# Patient Record
Sex: FEMALE | Race: BLACK OR AFRICAN AMERICAN
[De-identification: names, ages, dates, MRNs, and addresses within clinical notes are randomized per-mention and may not be internally consistent; named-entity substitution may affect disease eponyms.]

---

## 2017-12-18 NOTE — HP
Satellite [facility]





- Chief Complaint


Chief Complaint: right knee pain





- Past Medical History


Allergies/Adverse Reactions: 


 Allergies











Allergy/AdvReac Type Severity Reaction Status Date / Time


 


No Known Allergies Allergy   Verified 12/04/17 11:18














- Current Medications


Current Medications: 


 Home Medications











 Medication  Instructions  Recorded


 


Albuterol Sulfate Inhaler - 1 - 2 inh PO BID 12/04/17





[Ventolin Hfa Inhaler -]  


 


Aspirin [ASA -] 81 mg PO DAILY 12/04/17


 


Felodipine [Felodipine ER] 5 mg PO DAILY 12/04/17


 


Ferrous Sulfate 325 mg PO DAILY 12/04/17


 


Glipizide 5 mg PO DAILY 12/04/17


 


Metformin Xr [Glucophage *Xr* -] 750 mg PO DAILY 12/04/17


 


Multivit with Iron-Minerals 1 each PO DAILY 12/04/17





[Compete]  


 


Naproxen Sodium [Naproxen Sodium 500 mg PO BID PRN 12/04/17





Cr]  


 


Rosuvastatin Calcium 5 mg PO DAILY 12/04/17


 


Salmeterol/Fluticasone [Advair 1 inh PO BID 12/04/17





250Mcg/50Mcg -]  














Satellite Physical Exam





- Physical Examination


General Appearance: Well Nourished, Well Developed, Alert & Oriented x3


ENT: Clear


Lung: Normal air movement


Heart: Regular rate & rhythm


Extremities: Other (right knee- + swelling, + ttp, decr rom, nvi xrays show 

grade 4 djd)


Neurological: Intact, Alert, Oriented





Satellite Impression/Plan





- Impression/Plan


Impression: right knee djd


Operative Procedure: right grecia tkr


Date to be Performed: 12/19/17

## 2017-12-19 ENCOUNTER — HOSPITAL ENCOUNTER (INPATIENT)
Dept: HOSPITAL 74 - FM/S | Age: 62
LOS: 2 days | Discharge: HOME HEALTH | DRG: 470 | End: 2017-12-21
Attending: ORTHOPAEDIC SURGERY | Admitting: ORTHOPAEDIC SURGERY
Payer: COMMERCIAL

## 2017-12-19 VITALS — BODY MASS INDEX: 38 KG/M2

## 2017-12-19 DIAGNOSIS — I10: ICD-10-CM

## 2017-12-19 DIAGNOSIS — E11.9: ICD-10-CM

## 2017-12-19 DIAGNOSIS — J44.9: ICD-10-CM

## 2017-12-19 DIAGNOSIS — E66.8: ICD-10-CM

## 2017-12-19 DIAGNOSIS — M17.11: Primary | ICD-10-CM

## 2017-12-19 PROCEDURE — 8E0Y0CZ ROBOTIC ASSISTED PROCEDURE OF LOWER EXTREMITY, OPEN APPROACH: ICD-10-PCS | Performed by: ORTHOPAEDIC SURGERY

## 2017-12-19 PROCEDURE — 0SRC0JZ REPLACEMENT OF RIGHT KNEE JOINT WITH SYNTHETIC SUBSTITUTE, OPEN APPROACH: ICD-10-PCS | Performed by: ORTHOPAEDIC SURGERY

## 2017-12-19 RX ADMIN — GABAPENTIN ONE MG: 300 CAPSULE ORAL at 06:30

## 2017-12-19 RX ADMIN — ACETAMINOPHEN SCH MG: 325 TABLET ORAL at 16:31

## 2017-12-19 RX ADMIN — DOCUSATE SODIUM,SENNOSIDES SCH TABLET: 50; 8.6 TABLET, FILM COATED ORAL at 21:54

## 2017-12-19 RX ADMIN — CELECOXIB ONE MG: 200 CAPSULE ORAL at 06:30

## 2017-12-19 RX ADMIN — INSULIN ASPART SCH UNIT: 100 INJECTION, SOLUTION INTRAVENOUS; SUBCUTANEOUS at 16:28

## 2017-12-19 RX ADMIN — CEFAZOLIN SODIUM SCH MLS/HR: 2 SOLUTION INTRAVENOUS at 15:37

## 2017-12-19 RX ADMIN — ALBUTEROL SULFATE SCH: 90 AEROSOL, METERED RESPIRATORY (INHALATION) at 21:55

## 2017-12-19 RX ADMIN — OXYCODONE HYDROCHLORIDE ONE MG: 10 TABLET, FILM COATED, EXTENDED RELEASE ORAL at 06:30

## 2017-12-19 RX ADMIN — ACETAMINOPHEN SCH MG: 325 TABLET ORAL at 10:24

## 2017-12-19 RX ADMIN — INSULIN ASPART SCH UNIT: 100 INJECTION, SOLUTION INTRAVENOUS; SUBCUTANEOUS at 21:55

## 2017-12-19 RX ADMIN — OXYCODONE HYDROCHLORIDE SCH MG: 10 TABLET, FILM COATED, EXTENDED RELEASE ORAL at 21:56

## 2017-12-19 RX ADMIN — GABAPENTIN SCH MG: 300 CAPSULE ORAL at 21:54

## 2017-12-19 NOTE — OP
Operative Note





- Note:


Operative Date: 12/19/17 (duncan)


Pre-Operative Diagnosis: right knee djd


Operation: right grecia tkr


Post-Operative Diagnosis: Same as Pre-op


Surgeon: Naeem Croft


Assistant: Sabino Gonzalez


Anesthesiologist/CRNA: Elliot Avina


Anesthesia: Spinal, Local


Specimens Removed: bone fragments


Estimated Blood Loss (mls): 100


Operative Report Dictated: Yes

## 2017-12-20 VITALS — TEMPERATURE: 99.5 F

## 2017-12-20 LAB
DEPRECATED RDW RBC AUTO: 14.1 % (ref 11.6–15.6)
MCH RBC QN AUTO: 29.4 PG (ref 25.7–33.7)
MCHC RBC AUTO-ENTMCNC: 33 G/DL (ref 32–36)
MCV RBC: 89 FL (ref 80–96)
PLATELET # BLD AUTO: 368 K/MM3 (ref 134–434)
PMV BLD: 8.2 FL (ref 7.5–11.1)
WBC # BLD AUTO: 11.3 K/MM3 (ref 4–10.8)

## 2017-12-20 RX ADMIN — CELECOXIB ONE: 200 CAPSULE ORAL at 08:07

## 2017-12-20 RX ADMIN — BUDESONIDE AND FORMOTEROL FUMARATE DIHYDRATE SCH: 80; 4.5 AEROSOL RESPIRATORY (INHALATION) at 06:43

## 2017-12-20 RX ADMIN — HYDROCHLOROTHIAZIDE SCH MG: 12.5 CAPSULE ORAL at 10:01

## 2017-12-20 RX ADMIN — ACETAMINOPHEN SCH MG: 325 TABLET ORAL at 04:38

## 2017-12-20 RX ADMIN — GABAPENTIN ONE: 300 CAPSULE ORAL at 08:07

## 2017-12-20 RX ADMIN — GABAPENTIN SCH MG: 300 CAPSULE ORAL at 10:01

## 2017-12-20 RX ADMIN — OXYCODONE HYDROCHLORIDE SCH MG: 10 TABLET, FILM COATED, EXTENDED RELEASE ORAL at 21:27

## 2017-12-20 RX ADMIN — OXYCODONE HYDROCHLORIDE SCH MG: 10 TABLET, FILM COATED, EXTENDED RELEASE ORAL at 10:00

## 2017-12-20 RX ADMIN — ACETAMINOPHEN SCH: 325 TABLET ORAL at 08:08

## 2017-12-20 RX ADMIN — ACETAMINOPHEN SCH MG: 325 TABLET ORAL at 01:02

## 2017-12-20 RX ADMIN — PANTOPRAZOLE SODIUM SCH: 40 TABLET, DELAYED RELEASE ORAL at 08:08

## 2017-12-20 RX ADMIN — INSULIN ASPART SCH: 100 INJECTION, SOLUTION INTRAVENOUS; SUBCUTANEOUS at 17:25

## 2017-12-20 RX ADMIN — DOCUSATE SODIUM,SENNOSIDES SCH TABLET: 50; 8.6 TABLET, FILM COATED ORAL at 10:01

## 2017-12-20 RX ADMIN — ACETAMINOPHEN SCH MG: 325 TABLET ORAL at 15:42

## 2017-12-20 RX ADMIN — ALBUTEROL SULFATE SCH: 90 AEROSOL, METERED RESPIRATORY (INHALATION) at 08:08

## 2017-12-20 RX ADMIN — INSULIN ASPART SCH: 100 INJECTION, SOLUTION INTRAVENOUS; SUBCUTANEOUS at 08:08

## 2017-12-20 RX ADMIN — DOCUSATE SODIUM,SENNOSIDES SCH: 50; 8.6 TABLET, FILM COATED ORAL at 08:08

## 2017-12-20 RX ADMIN — INSULIN ASPART SCH UNITS: 100 INJECTION, SOLUTION INTRAVENOUS; SUBCUTANEOUS at 22:29

## 2017-12-20 RX ADMIN — MULTIVITAMIN TABLET SCH: TABLET at 08:08

## 2017-12-20 RX ADMIN — ALBUTEROL SULFATE SCH PUFF: 90 AEROSOL, METERED RESPIRATORY (INHALATION) at 21:28

## 2017-12-20 RX ADMIN — ROSUVASTATIN CALCIUM SCH MG: 5 TABLET, FILM COATED ORAL at 10:03

## 2017-12-20 RX ADMIN — INSULIN ASPART SCH: 100 INJECTION, SOLUTION INTRAVENOUS; SUBCUTANEOUS at 12:11

## 2017-12-20 RX ADMIN — ACETAMINOPHEN SCH MG: 325 TABLET ORAL at 10:02

## 2017-12-20 RX ADMIN — BUDESONIDE AND FORMOTEROL FUMARATE DIHYDRATE SCH PUFF: 80; 4.5 AEROSOL RESPIRATORY (INHALATION) at 10:01

## 2017-12-20 RX ADMIN — INSULIN ASPART SCH UNIT: 100 INJECTION, SOLUTION INTRAVENOUS; SUBCUTANEOUS at 06:43

## 2017-12-20 RX ADMIN — ASPIRIN 325 MG ORAL TABLET SCH MG: 325 PILL ORAL at 08:33

## 2017-12-20 RX ADMIN — FERROUS SULFATE TAB EC 324 MG (65 MG FE EQUIVALENT) SCH MG: 324 (65 FE) TABLET DELAYED RESPONSE at 10:01

## 2017-12-20 RX ADMIN — ACETAMINOPHEN SCH MG: 325 TABLET ORAL at 22:30

## 2017-12-20 RX ADMIN — VALSARTAN SCH MG: 160 TABLET, FILM COATED ORAL at 10:01

## 2017-12-20 RX ADMIN — GLIPIZIDE SCH MG: 5 TABLET ORAL at 06:41

## 2017-12-20 RX ADMIN — BUDESONIDE AND FORMOTEROL FUMARATE DIHYDRATE SCH PUFF: 80; 4.5 AEROSOL RESPIRATORY (INHALATION) at 21:28

## 2017-12-20 RX ADMIN — GABAPENTIN SCH MG: 300 CAPSULE ORAL at 21:27

## 2017-12-20 RX ADMIN — PANTOPRAZOLE SODIUM SCH MG: 40 TABLET, DELAYED RELEASE ORAL at 10:01

## 2017-12-20 RX ADMIN — MULTIVITAMIN TABLET SCH TAB: TABLET at 10:01

## 2017-12-20 RX ADMIN — OXYCODONE HYDROCHLORIDE ONE: 10 TABLET, FILM COATED, EXTENDED RELEASE ORAL at 08:07

## 2017-12-20 RX ADMIN — ALBUTEROL SULFATE SCH PUFF: 90 AEROSOL, METERED RESPIRATORY (INHALATION) at 10:02

## 2017-12-20 RX ADMIN — FERROUS SULFATE TAB EC 324 MG (65 MG FE EQUIVALENT) SCH: 324 (65 FE) TABLET DELAYED RESPONSE at 08:08

## 2017-12-20 RX ADMIN — AMLODIPINE BESYLATE SCH MG: 5 TABLET ORAL at 10:39

## 2017-12-20 RX ADMIN — CEFAZOLIN SODIUM SCH MLS/HR: 2 SOLUTION INTRAVENOUS at 00:00

## 2017-12-20 RX ADMIN — DOCUSATE SODIUM,SENNOSIDES SCH TABLET: 50; 8.6 TABLET, FILM COATED ORAL at 21:28

## 2017-12-20 NOTE — PN
Progress Note (short form)





- Note


Progress Note: 





Ortho





Pt seen and examined s/p right grecia tkr pod #1


 Selected Entries











  12/20/17





  09:00


 


Temperature 99.1 F


 


Pulse Rate 88


 


Respiratory 18





Rate 


 


Blood Pressure 154/66








 Laboratory Tests











  12/20/17





  07:30


 


WBC  11.3 H D


 


Hgb  11.0


 


Hct  33.3


 


Plt Count  368  D








Dressing c/d/i, calf soft, nt


rom 0-50, nvi





a/p


PT


dvt ppx


pain control


d/c home tomorrow if stable

## 2017-12-20 NOTE — PN
Progress Note (short form)





- Note


Progress Note: 





62F POD1 s/p R TKR under spinal anesthetic with peripheral nerve blocks for 

post operative pain. Pt states that pain is well controlled, reports no 

anesthetic complications. Sensory and motor function is intact in bilateral 

lower extremities.

## 2017-12-21 VITALS — SYSTOLIC BLOOD PRESSURE: 159 MMHG | HEART RATE: 91 BPM | DIASTOLIC BLOOD PRESSURE: 79 MMHG

## 2017-12-21 LAB
DEPRECATED RDW RBC AUTO: 14.3 % (ref 11.6–15.6)
MCH RBC QN AUTO: 29.3 PG (ref 25.7–33.7)
MCHC RBC AUTO-ENTMCNC: 32.7 G/DL (ref 32–36)
MCV RBC: 89.5 FL (ref 80–96)
PLATELET # BLD AUTO: 321 K/MM3 (ref 134–434)
PMV BLD: 8.5 FL (ref 7.5–11.1)
WBC # BLD AUTO: 9.9 K/MM3 (ref 4–10.8)

## 2017-12-21 RX ADMIN — OXYCODONE HYDROCHLORIDE SCH MG: 10 TABLET, FILM COATED, EXTENDED RELEASE ORAL at 09:40

## 2017-12-21 RX ADMIN — ACETAMINOPHEN SCH MG: 325 TABLET ORAL at 09:38

## 2017-12-21 RX ADMIN — PANTOPRAZOLE SODIUM SCH MG: 40 TABLET, DELAYED RELEASE ORAL at 09:40

## 2017-12-21 RX ADMIN — MULTIVITAMIN TABLET SCH TAB: TABLET at 09:40

## 2017-12-21 RX ADMIN — ACETAMINOPHEN SCH MG: 325 TABLET ORAL at 06:18

## 2017-12-21 RX ADMIN — BUDESONIDE AND FORMOTEROL FUMARATE DIHYDRATE SCH PUFF: 80; 4.5 AEROSOL RESPIRATORY (INHALATION) at 09:41

## 2017-12-21 RX ADMIN — AMLODIPINE BESYLATE SCH MG: 5 TABLET ORAL at 09:41

## 2017-12-21 RX ADMIN — VALSARTAN SCH MG: 160 TABLET, FILM COATED ORAL at 09:39

## 2017-12-21 RX ADMIN — HYDROCHLOROTHIAZIDE SCH MG: 12.5 CAPSULE ORAL at 09:40

## 2017-12-21 RX ADMIN — DOCUSATE SODIUM,SENNOSIDES SCH TABLET: 50; 8.6 TABLET, FILM COATED ORAL at 09:39

## 2017-12-21 RX ADMIN — ROSUVASTATIN CALCIUM SCH MG: 5 TABLET, FILM COATED ORAL at 09:39

## 2017-12-21 RX ADMIN — GABAPENTIN SCH MG: 300 CAPSULE ORAL at 09:40

## 2017-12-21 RX ADMIN — ASPIRIN 325 MG ORAL TABLET SCH MG: 325 PILL ORAL at 09:38

## 2017-12-21 RX ADMIN — GLIPIZIDE SCH MG: 5 TABLET ORAL at 06:19

## 2017-12-21 RX ADMIN — FERROUS SULFATE TAB EC 324 MG (65 MG FE EQUIVALENT) SCH MG: 324 (65 FE) TABLET DELAYED RESPONSE at 09:40

## 2017-12-21 NOTE — DS
Physical Examination


Vital Signs: 


 Vital Signs











Temperature  99.5 F   12/21/17 06:00


 


Pulse Rate  91 H  12/21/17 06:00


 


Respiratory Rate  19   12/21/17 06:00


 


Blood Pressure  159/79   12/21/17 06:00


 


O2 Sat by Pulse Oximetry (%)  94 L  12/20/17 22:00














Discharge Summary


Reason For Visit: OSTEOARTHRITIS


Procedures: Principal: s/p right grecia tkr


Hospital Course: 





admitted fro elective right grecia tkr, uneventful post-op, stable for d/c


Condition: Good





- Instructions


Diet, Activity, Other Instructions: 


Post-op Instructions-Total Knee Replacement                                    

               





     Call the office for a follow-up  appointment in 1 week - [phone]


     Aspirin 325mg daily for 6 weeks. Pain medication was sent into your 

pharmacy.                      


     Apply  Graduated Compression Stockings (TEDs) to both lower extremities-

remove daily


      for hygiene  ONLY


     Apply Sequential Compression Device (SCDs)  to both Lower extremities 

remove for PT 


      and hygiene ONLY  


     Apply cold packs to affected area for 15 minutes every 2 hours.


     Physical Therapist will come to your home for the first 5 days. You will 

be set up with 


      outpatient PT at your first post-operative visit.


     Patient may ambulate as tolerated-encourage self care (at least every 2-3 

hours while awake) 


      with walker or cane


     Maintain Aquacel (waterproof) dressing to operative wound (will be 

removed by surgeon at 


      first office visit)


     Shower with Aquacel dressing in place-if Aquacel integrity compromised, 

remove and apply 


      dry sterile dressing and notify Orthopedist.  DO NOT SHOWER unless 

Orthopedists approves without Aquacel 


      dressing





     CONTACT THE OFFICE FOR ANY CHANGE IN YOUR CONDITION (for example-fever 


      greater than 102 degrees, excessive bleeding from operative site, 

purulent drainage, 


      severe swelling or pain)    GO TO THE EMERGENCY ROOM IF THERE IS A  

MEDICAL EMERGENCY





     Knee Precautions:  


      * Keep a rolled towel under affected heel while in bed or chair (to keep 

knee 


        in extension)


      * Keep affected leg elevated except during mealtimes


      * DO NOT PLACE PILLOW UNDER AFFECTED KNEE





  * If you have any questions, please do not hesitate to call the office - [phone].





Referrals: 


Naeem Croft MD [Staff Physician] - 


Disposition: [facility]/HOME HEALTH CARE





- Home Medications


Comprehensive Discharge Medication List: 


Ambulatory Orders





Albuterol Sulfate Inhaler - [Ventolin HFA Inhaler -] 1 - 2 inh PO BID 12/04/17 


Felodipine [Felodipine ER] 5 mg PO DAILY 12/04/17 


Ferrous Sulfate 325 mg PO DAILY 12/04/17 


Glipizide 5 mg PO DAILY 12/04/17 


Metformin Xr [Glucophage Xr -] 750 mg PO DAILY 12/04/17 


Multivit with Iron,Minerals [Compete] 1 each PO DAILY 12/04/17 


Naproxen Sodium [Naproxen Sodium Cr] 500 mg PO BID PRN 12/04/17 


Rosuvastatin Calcium 5 mg PO DAILY 12/04/17 


Salmeterol/Fluticasone [Advair 250Mcg/50Mcg -] 1 inh PO BID 12/04/17 


Aspirin [ASA -] 325 mg PO DAILY@0800  tablet 12/19/17 


Oxycodone HCl/Acetaminophen [Percocet 5-325 mg Tablet] 1 - 2 tab PO Q6H #50 tab 

MDD 8 12/19/17 


Valsartan/Hydrochlorothiazide [Valsartan-Hctz 160-12.5 mg Tab] 1 each PO DAILY 

12/19/17

## 2017-12-22 NOTE — SPEC
DATE OF OPERATION:  12/19/2017

 

DATE OF DICTATION:  12/20/2017

 

PREOPERATIVE DIAGNOSIS:  Degenerative joint disease, right knee.

 

POSTOPERATIVE DIAGNOSIS:  Degenerative joint disease, right knee.

 

PROCEDURE:  Right total knee replacement with robotic-assisted navigation

(MAKOplasty).

 

SURGICAL ATTENDING:  Naeem Crfot MD

 

FIRST ASSISTANT:  CHE Gerardo

 

ANESTHESIA:  Regional and spinal.

 

CLOSURE:  A Press-Fit Triathlon knee system with a 5 femur, 4 tibia, 9 polyethylene,

a 32 patella; No. 1 Vicryl, fascia; 0 and 2-0 for subcutaneous; and 3-0 Monocryl

subcuticular with skin glue for skin; 4-0 undyed Vicryl for pin sites.

 

ESTIMATED BLOOD LOSS:  Less than 100 mL.

 

COMPLICATIONS:  None.

 

CONDITION:  To recovery room in stable condition.

 

DESCRIPTION OF OPERATIVE PROCEDURE:  Patient was taken to the operating room on

December 19, 2017.  Regional and spinal anesthesia was administered by the

anesthesiologist.  IV Kefzol was administered prophylactically prior to the case as

well as TXA.  The right lower extremity was prepped and draped in the usual sterile

fashion.

 

The midline 10- to 12-cm longitudinal incision was made.  Hemostasis was achieved

with Bovie cautery.  Sharp dissection was carried down to the extensor mechanism

which was _____ perform the procedure.  Medial parapatellar arthrotomy was then

performed, leaving a cuff of tissue for later closure.  The patella was inverted and

the knee was flexed up.  The fat pad was excised.  Subperiosteal dissection was done

on the anteromedial proximal tibia until the knee was able to be brought forward. 

This was facilitated by taking the ACL, PCL and medial and lateral menisci. 

Checkpoints were placed in both the femur and in the tibia.  Two parallel threaded

pins were drilled superior to the knee joint through the already made incision from

anterior to posterior just going through the anterior cortex but just engaging but

not going through the posterior cortex.  Two threaded pins were drilled through 2

small stab incisions in parallel fashion 1 handbreadth below the tibial tubercle

through the anterior cortex of the tibia and engaging but not going through the

posterior cortex.  Both sets of pins were attached to navigation arrays for the JEFERSON

system.

 

The knee was then registered with the navigation system with center of rotation of

the hip, medial and lateral malleoli and multiple sites both on the tibia and on the

femur.  Confirmation of excellent registration was confirmed by "popping the

bubbles."  At this time, the knee was thoroughly inspected to remove all osteophytes

around the knee.  The knee was then tensioned in varus/valgus at both full extension

and at 90 degrees of flexion to ascertain our gaps.  The virtual position of the

components was optimized to ensure equal gaps throughout the range of motion.  Once

this was performed, the robot was brought into the field, was registered.  The bone

was cut as per the specifications on both the tibia and on the femur.  The box cuts

were then made as well.  Excellent trial stability was obtained on the femur.  The

tibial baseplate was allowed to "find itself" and then was clipped into place. 

Confirmation of excellent external rotation of that component was confirmed by the

navigation device as well.The patella was calibered for thickness and cut at the

appropriate level.  The appropriate lollipop was used to drill 3 holes in the patella

and a trial asymmetric patellar button was applied.  The knee was taken through a

range of motion and found to have excellent stability from full extension to full

flexion with excellent tracking of the patella.  The trial components were then

removed.  The lug holes were drilled in the femur.  The cementless keel was punched

in the tibia.  The real Press-Fit components were malleted into place, first with the

tibia and then with the femur, and then the patella was crimped into place as well. 

The real polyethylene liner was then clipped into place.  Range of motion, stability

and tracking were as described earlier.

 

The knee was thoroughly irrigated with copious amounts of irrigation.  Vancomycin

powder was placed inside the joint.  The medial parapatellar arthrotomy was then

closed using No. 1 Vicryl interrupted suture.  Post closure of the arthrotomy, the

knee was taken through a range of motion and found to have no undue tension on the

repair.  The subcutaneous was then pulse antibiotic irrigated, closed with 0 and 2-0

Vicryl and 3-0 Monocryl subcuticular with skin glue for the skin.  Prior to closure,

the checkpoints were removed as were the threaded pins.  The tibial pin sites were

closed with 4-0 undyed Vicryl.  A sterile pressure Aquacel dressing was applied.  No

tourniquet was used during the case.  The total blood loss was approximately 100 mL. 

No complication.  Patient was transferred to recovery in stable condition.

 

 

ISABELA [id number]

DD: 12/20/2017 10:01

DT: 12/21/2017 10:35

Job #:  [id number]

## 2017-12-22 NOTE — PATH
Surgical Pathology Report



Patient Name:  LAN GARAY

Accession #:  [id number]

Med. Rec. #:  [id number]                                                        

   /Age/Gender:  1955 (Age: 62) / F

Account:  [id number]                                                          

             Location: [facility] MED[facility]

Taken:  2017

Received:  2017

Reported:  2017

Physicians:  Naeem Croft M.D.

  



Specimen(s) Received

 RIGHT KNEE BONE AND TISSUE 





Clinical History

Right knee osteoarthritis







Final Diagnosis

KNEE BONE AND TISSUE, RIGHT, TOTAL KNEE REPLACEMENT:

DEGENERATIVE JOINT DISEASE.





***Electronically Signed***

Rosalind Bunch M.D.





Gross Description

Received in formalin labeled "right knee bone and tissue," is a 14.5 x 11.5 x

2.0 cm aggregate of multiple tan, irregular portions of bone and soft tissue.

The tibial plateau measures 6.8 x 5.0 x 1.2 cm. There is a 2.5 cm greatest

dimension area of eburnation present. The remaining articular surfaces are

tan-yellow and diffusely granular. The underlying trabecular bone is yellow and

hard. Representative sections are submitted in one cassette, following

decalcification.





[facility]

## 2019-12-10 ENCOUNTER — HOSPITAL ENCOUNTER (INPATIENT)
Dept: HOSPITAL 74 - FM/S | Age: 64
LOS: 2 days | Discharge: HOME HEALTH | DRG: 470 | End: 2019-12-12
Attending: ORTHOPAEDIC SURGERY | Admitting: ORTHOPAEDIC SURGERY
Payer: COMMERCIAL

## 2019-12-10 VITALS — BODY MASS INDEX: 35.9 KG/M2

## 2019-12-10 DIAGNOSIS — J44.9: ICD-10-CM

## 2019-12-10 DIAGNOSIS — M17.12: Primary | ICD-10-CM

## 2019-12-10 DIAGNOSIS — E11.9: ICD-10-CM

## 2019-12-10 DIAGNOSIS — E66.9: ICD-10-CM

## 2019-12-10 DIAGNOSIS — I10: ICD-10-CM

## 2019-12-10 PROCEDURE — 0SRD06A REPLACEMENT OF LEFT KNEE JOINT WITH OXIDIZED ZIRCONIUM ON POLYETHYLENE SYNTHETIC SUBSTITUTE, UNCEMENTED, OPEN APPROACH: ICD-10-PCS | Performed by: ORTHOPAEDIC SURGERY

## 2019-12-10 PROCEDURE — 8E0Y0CZ ROBOTIC ASSISTED PROCEDURE OF LOWER EXTREMITY, OPEN APPROACH: ICD-10-PCS | Performed by: ORTHOPAEDIC SURGERY

## 2019-12-10 RX ADMIN — CEFAZOLIN SCH MLS/HR: 1 INJECTION, POWDER, FOR SOLUTION INTRAVENOUS at 16:10

## 2019-12-10 RX ADMIN — ACETAMINOPHEN SCH MG: 325 TABLET ORAL at 18:51

## 2019-12-10 RX ADMIN — ACETAMINOPHEN SCH MG: 325 TABLET ORAL at 23:31

## 2019-12-10 RX ADMIN — BUDESONIDE AND FORMOTEROL FUMARATE DIHYDRATE SCH PUFF: 80; 4.5 AEROSOL RESPIRATORY (INHALATION) at 21:29

## 2019-12-10 RX ADMIN — TIOTROPIUM BROMIDE INHALATION SPRAY SCH: 3.12 SPRAY, METERED RESPIRATORY (INHALATION) at 10:43

## 2019-12-10 RX ADMIN — ACETAMINOPHEN SCH MG: 325 TABLET ORAL at 12:52

## 2019-12-10 RX ADMIN — OXYCODONE HYDROCHLORIDE SCH MG: 10 TABLET, FILM COATED, EXTENDED RELEASE ORAL at 21:26

## 2019-12-10 RX ADMIN — CEFAZOLIN SCH MLS/HR: 1 INJECTION, POWDER, FOR SOLUTION INTRAVENOUS at 23:31

## 2019-12-10 RX ADMIN — INSULIN ASPART SCH UNITS: 100 INJECTION, SOLUTION INTRAVENOUS; SUBCUTANEOUS at 21:36

## 2019-12-10 RX ADMIN — INSULIN ASPART SCH UNITS: 100 INJECTION, SOLUTION INTRAVENOUS; SUBCUTANEOUS at 16:52

## 2019-12-10 RX ADMIN — PANTOPRAZOLE SODIUM SCH: 40 TABLET, DELAYED RELEASE ORAL at 10:43

## 2019-12-10 RX ADMIN — INSULIN ASPART SCH: 100 INJECTION, SOLUTION INTRAVENOUS; SUBCUTANEOUS at 10:55

## 2019-12-10 RX ADMIN — DOCUSATE SODIUM,SENNOSIDES SCH TABLET: 50; 8.6 TABLET, FILM COATED ORAL at 21:27

## 2019-12-10 NOTE — HP
Satellite [facility]





- Chief Complaint


Chief Complaint: left knee pain





- Past Medical History


Allergies/Adverse Reactions: 


 Allergies











Allergy/AdvReac Type Severity Reaction Status Date / Time


 


No Known Allergies Allergy   Verified 12/03/19 16:34














- Current Medications


Current Medications: 


 Home Medications











 Medication  Instructions  Recorded


 


Albuterol Sulfate Inhaler - 1 - 2 inh PO BID PRN 12/04/17





[Ventolin HFA Inhaler -]  


 


Felodipine [Felodipine ER] 5 mg PO DAILY 12/04/17


 


Ferrous Sulfate 325 mg PO DAILY 12/04/17


 


Glipizide 5 mg PO DAILY 12/04/17


 


Multivit with Iron,Minerals 1 each PO DAILY 12/04/17





[Compete]  


 


Rosuvastatin Calcium 5 mg PO DAILY 12/04/17


 


Salmeterol/Fluticasone [Advair 1 inh PO BID 12/04/17





250Mcg/50Mcg -]  


 


metFORMIN XR [Glucophage Xr -] 750 mg PO DAILY 12/04/17


 


Valsartan/Hydrochlorothiazide 1 each PO DAILY 12/19/17





[Valsartan-Hctz 160-12.5 mg Tab]  


 


Aclidinium Bromide [Tudorza 400 mcg IH BID 12/03/19





Pressair]  


 


Aspirin [ASA -] 81 mg PO DAILY 12/03/19


 


Montelukast Sodium [Singulair] 10 mg PO HS 12/03/19


 


Naproxen/Esomeprazole Mag [Vimovo 1 each PO PRN PRN 12/03/19





Dr 500-20 mg Tablet]  














Satellite Physical Exam





- Physical Examination


Vital Signs: 


 Vital Signs











 Period  Temp  Pulse  Resp  BP Sys/Francisco  Pulse Ox


 


 Last 24 Hr  98.4 F  78  18  143/80  











General Appearance: Well Nourished, Well Developed, Alert & Oriented x3


ENT: Clear


Lung: Normal air movement


Extremities: Other (left knee- + swelling, + ttp ,decr rom, nvi, xrays show 

grade 4 tricompartmental djd)


Neurological: Intact, Alert, Oriented





Satellite Impression/Plan





- Impression/Plan


Impression: left knee djd


Operative Procedure: left grecia tkr


Date to be Performed: 12/10/19

## 2019-12-10 NOTE — SPEC
DATE OF OPERATION:  12/10/2019

 

PREOPERATIVE DIAGNOSIS:  Degenerative joint disease, left knee.

 

POSTOPERATIVE DIAGNOSIS:  Degenerative joint disease, left knee.

 

PROCEDURE:  Left total knee replacement with robotic-assisted navigation

(MAKOplasty).

 

SURGICAL ATTENDING:  Naeem Croft MD 

 

FIRST ASSISTANT:  CHE Gerardo 

 

ANESTHESIA:  Regional and spinal.

 

CLOSURE:  A Triathlon press-fit total knee system with a 5 femur, 4 tibia, 9 PS

polyethylene, 32 patella, number 1 Vicryl fascia, 0 and 2-0 subcutaneous, 3-0

Monocryl subcuticular with skin glue for skin, 4-0 undyed Vicryl for pin sites.  

 

ESTIMATED BLOOD LOSS:  Less than 100 mL.

 

COMPLICATIONS:  None.  

 

CONDITION:  To recovery room in stable condition.  

 

DESCRIPTION OF OPERATIVE PROCEDURE:  Patient was taken to the operating room on

December 10, 2019.  Regional and spinal anesthesia was administered by the

anesthesiologist.  IV Kefzol was administered prophylactically prior to the case as

well as TXA.  The left lower extremity was prepped and draped in the usual sterile

fashion.

 

The midline 10- to 12-cm longitudinal incision was made.  Hemostasis was achieved

with Bovie cautery.  Sharp dissection was carried down to the extensor mechanism

which was _____ perform the procedure.  Medial parapatellar arthrotomy was then

performed, leaving a cuff of tissue for later closure.  The patella was inverted and

the knee was flexed up.  The fat pad was excised.  Subperiosteal dissection was done

on the anteromedial proximal tibia until the knee was able to be brought forward. 

This was facilitated by taking the ACL, PCL and medial and lateral menisci. 

Checkpoints were placed in both the femur and in the tibia.  Two parallel threaded

pins were drilled superior to the knee joint through the already made incision from

anterior to posterior just going through the anterior cortex but just engaging but

not going through the posterior cortex.  Two threaded pins were drilled through 2

small stab incisions in parallel fashion 1 handbreadth below the tibial tubercle

through the anterior cortex of the tibia and engaging but not going through the

posterior cortex.  Both sets of pins were attached to navigation arrays for the JEFERSON

system.

 

The knee was then registered with the navigation system with center of rotation of

the hip, medial and lateral malleoli and multiple sites both on the tibia and on the

femur.  Confirmation of excellent registration was confirmed by "popping the

bubbles."  At this time, the knee was thoroughly inspected to remove all osteophytes

around the knee.  The knee was then tensioned in varus/valgus at both full extension

and at 90 degrees of flexion to ascertain our gaps.  The virtual position of the

components was optimized to ensure equal gaps throughout the range of motion.  Once

this was performed, the robot was brought into the field, was registered.  The bone

was cut as per the specifications on both the tibia and on the femur.  The box cuts

were then made as well.  Excellent trial stability was obtained on the femur.  The

tibial baseplate was allowed to "find itself" and then was clipped into place. 

Confirmation of excellent external rotation of that component was confirmed by the

navigation device as well.The patella was calibered for thickness and cut at the

appropriate level.  The appropriate lollipop was used to drill 3 holes in the patella

and a trial asymmetric patellar button was applied.  The knee was taken through a

range of motion and found to have excellent stability from full extension to full

flexion with excellent tracking of the patella.  The trial components were then

removed.  The lug holes were drilled in the femur.  The cementless keel was punched

in the tibia.  The real Press-Fit components were malleted into place, first with the

tibia and then with the femur, and then the patella was crimped into place as well. 

The real polyethylene liner was then clipped into place.  Range of motion, stability

and tracking were as described earlier.

 

The knee was thoroughly irrigated with copious amounts of irrigation.  Vancomycin

powder was placed inside the joint.  The medial parapatellar arthrotomy was then

closed using No. 1 Vicryl interrupted suture.  Post closure of the arthrotomy, the

knee was taken through a range of motion and found to have no undue tension on the

repair.  The subcutaneous was then pulse antibiotic irrigated, closed with 0 and 2-0

Vicryl and 3-0 Monocryl subcuticular with skin glue for the skin.  Prior to closure,

the checkpoints were removed as were the threaded pins.  The tibial pin sites were

closed with 4-0 undyed Vicryl.  A sterile pressure Aquacel dressing was applied.  No

tourniquet was used during the case.  The total blood loss was approximately 100 mL. 

No complication.  Patient was transferred to recovery in stable condition.

 

 

ISABELA [id number]

DD: 12/10/2019 11:27

DT: 12/10/2019 12:05

Job #:  [id number]

## 2019-12-10 NOTE — OP
Operative Note





- Note:


Operative Date: 12/10/19 (duncan)


Pre-Operative Diagnosis: left knee djd


Operation: left grecia tkr


Post-Operative Diagnosis: Same as Pre-op


Surgeon: Naeem Croft


Assistant: Sabino Gonzalez


Anesthesiologist/CRNA: Domenico Godinez


Anesthesia: Spinal, Local


Specimens Removed: bone fragments


Estimated Blood Loss (mls): 150


Operative Report Dictated: Yes

## 2019-12-11 LAB
DEPRECATED RDW RBC AUTO: 14.4 % (ref 11.6–15.6)
HCT VFR BLD CALC: 30.9 % (ref 32.4–45.2)
HGB BLD-MCNC: 10 GM/DL (ref 10.7–15.3)
MCH RBC QN AUTO: 29.2 PG (ref 25.7–33.7)
MCHC RBC AUTO-ENTMCNC: 32.3 G/DL (ref 32–36)
MCV RBC: 90.2 FL (ref 80–96)
PLATELET # BLD AUTO: 263 K/MM3 (ref 134–434)
PMV BLD: 8.4 FL (ref 7.5–11.1)
RBC # BLD AUTO: 3.43 M/MM3 (ref 3.6–5.2)
WBC # BLD AUTO: 7.6 K/MM3 (ref 4–10.8)

## 2019-12-11 RX ADMIN — FERROUS SULFATE TAB EC 324 MG (65 MG FE EQUIVALENT) SCH MG: 324 (65 FE) TABLET DELAYED RESPONSE at 09:58

## 2019-12-11 RX ADMIN — AMLODIPINE BESYLATE SCH MG: 5 TABLET ORAL at 09:59

## 2019-12-11 RX ADMIN — ACETAMINOPHEN SCH MG: 325 TABLET ORAL at 18:09

## 2019-12-11 RX ADMIN — HYDROCHLOROTHIAZIDE SCH MG: 12.5 CAPSULE ORAL at 10:04

## 2019-12-11 RX ADMIN — OXYCODONE HYDROCHLORIDE SCH MG: 10 TABLET, FILM COATED, EXTENDED RELEASE ORAL at 21:34

## 2019-12-11 RX ADMIN — INSULIN ASPART SCH UNITS: 100 INJECTION, SOLUTION INTRAVENOUS; SUBCUTANEOUS at 21:34

## 2019-12-11 RX ADMIN — OXYCODONE HYDROCHLORIDE SCH MG: 10 TABLET, FILM COATED, EXTENDED RELEASE ORAL at 09:59

## 2019-12-11 RX ADMIN — DOCUSATE SODIUM,SENNOSIDES SCH TABLET: 50; 8.6 TABLET, FILM COATED ORAL at 09:57

## 2019-12-11 RX ADMIN — BUDESONIDE AND FORMOTEROL FUMARATE DIHYDRATE SCH PUFF: 80; 4.5 AEROSOL RESPIRATORY (INHALATION) at 10:05

## 2019-12-11 RX ADMIN — VALSARTAN SCH MG: 160 TABLET, FILM COATED ORAL at 09:58

## 2019-12-11 RX ADMIN — ACETAMINOPHEN SCH MG: 325 TABLET ORAL at 14:56

## 2019-12-11 RX ADMIN — INSULIN ASPART SCH: 100 INJECTION, SOLUTION INTRAVENOUS; SUBCUTANEOUS at 12:23

## 2019-12-11 RX ADMIN — ROSUVASTATIN CALCIUM SCH MG: 5 TABLET, FILM COATED ORAL at 09:58

## 2019-12-11 RX ADMIN — PANTOPRAZOLE SODIUM SCH MG: 40 TABLET, DELAYED RELEASE ORAL at 09:56

## 2019-12-11 RX ADMIN — GLIPIZIDE SCH MG: 5 TABLET ORAL at 06:27

## 2019-12-11 RX ADMIN — ASPIRIN 325 MG ORAL TABLET SCH MG: 325 PILL ORAL at 09:56

## 2019-12-11 RX ADMIN — INSULIN ASPART SCH UNITS: 100 INJECTION, SOLUTION INTRAVENOUS; SUBCUTANEOUS at 06:28

## 2019-12-11 RX ADMIN — DOCUSATE SODIUM,SENNOSIDES SCH TABLET: 50; 8.6 TABLET, FILM COATED ORAL at 21:35

## 2019-12-11 RX ADMIN — BUDESONIDE AND FORMOTEROL FUMARATE DIHYDRATE SCH PUFF: 80; 4.5 AEROSOL RESPIRATORY (INHALATION) at 21:36

## 2019-12-11 RX ADMIN — INSULIN ASPART SCH UNITS: 100 INJECTION, SOLUTION INTRAVENOUS; SUBCUTANEOUS at 05:00

## 2019-12-11 RX ADMIN — ACETAMINOPHEN SCH MG: 325 TABLET ORAL at 06:26

## 2019-12-11 RX ADMIN — Medication SCH EACH: at 10:00

## 2019-12-11 RX ADMIN — TIOTROPIUM BROMIDE INHALATION SPRAY SCH PUFF: 3.12 SPRAY, METERED RESPIRATORY (INHALATION) at 10:05

## 2019-12-11 NOTE — PN
Progress Note (short form)





- Note


Progress Note: 





64F POD1 s/p left TKR under spinal anesthetic with peripheral nerve blocks for 

post operative pain relief doing well.


Pt states that pain is well controlled and reports no anesthetic complications.


AVSS.


Continue current regimen.

## 2019-12-11 NOTE — PN
Progress Note (short form)





- Note


Progress Note: 





Ortho





Pt seen and examined s/p left grecia tkr pod #1





 Selected Entries











  12/11/19





  06:00


 


Temperature 98.3 F


 


Pulse Rate 83


 


Respiratory 18





Rate 


 


Blood Pressure 152/66








 Laboratory Tests











  12/11/19





  07:09


 


WBC  7.6


 


Hgb  10.0 L


 


Hct  30.9 L


 


Plt Count  263








dressing c/d/i, calf soft, nt


rom 0-40, nvi





a/p


PT


dvt ppx


pain control


d/c home tomorrow if stable

## 2019-12-11 NOTE — PN
Progress Note, Physician


Chief Complaint: 





AWAKE ALERT


IN PT ROOM


WALKING USING A WALKER


S/P LEFT KNEE JEFERSON


DENIES CP OR SOB


+BM





- Current Medication List


Current Medications: 


Active Medications





Acetaminophen (Tylenol -)  650 mg PO Q6H [facility]


   Stop: 12/13/19 11:59


   Last Admin: 12/11/19 06:26 Dose:  650 mg


Al Hydroxide/Mg Hydroxide (Mylanta Oral Suspension -)  30 ml PO Q4H PRN


   PRN Reason: DYSPEPSIA


Albuterol Sulfate (Ventolin Hfa Inhaler -)  1 - 2 puff IH BID PRN


   PRN Reason: ASTHMA


Amlodipine Besylate (Norvasc -)  5 mg PO DAILY [facility]


Aspirin (Asa -)  325 mg PO DAILY@0800 [facility]


Budesonide/Formoterol Fumarate (Symbicort 80/4.5mcg -)  2 puff IH BID [facility]


   Last Admin: 12/10/19 21:29 Dose:  2 puff


Fentanyl (Sublimaze Injection -)  50 mcg IVPUSH [id number] PRN


   PRN Reason: PAIN-PACU ORDER X 4 DOSES ONLY


Ferrous Sulfate (Feosol -)  325 mg PO DAILY [facility]


Glipizide (Glucotrol -)  5 mg PO ACBK [facility]


   Last Admin: 12/11/19 06:27 Dose:  5 mg


Hydrochlorothiazide (Hctz -)  12.5 mg PO DAILY [facility]


Insulin Aspart (Novolog Vial Sliding Scale -)  1 vial SQ [facility]; Protocol


   Last Admin: 12/11/19 06:28 Dose:  4 units


Magnesium Hydroxide (Milk Of Magnesia -)  30 ml PO PRN PRN


   PRN Reason: CONSTIPATION


Metformin HCl (Glucophage Xr -)  750 mg PO ACBK [facility]


   Last Admin: 12/11/19 06:27 Dose:  750 mg


Montelukast Sodium (Singulair -)  10 mg PO [facility]


Multivitamins/Minerals (Theragran-M)  1 each PO DAILY [facility]


Ondansetron HCl (Zofran Injection)  4 mg IVPUSH Q6H PRN


   PRN Reason: NAUSEA


Oxycodone HCl (Roxicodone -)  5 mg PO Q3H PRN


   PRN Reason: PAIN LEVEL 4 - 6


Oxycodone HCl (Roxicodone -)  10 mg PO Q3H PRN


   PRN Reason: PAIN LEVEL 7 - 10


   Last Admin: 12/11/19 06:27 Dose:  10 mg


Oxycodone HCl (Oxycontin -)  10 mg PO BID [facility]


   Stop: 12/13/19 11:36


   Last Admin: 12/10/19 21:26 Dose:  10 mg


Pantoprazole Sodium (Protonix -)  40 mg PO DAILY [facility]


   Last Admin: 12/10/19 10:43 Dose:  Not Given


Rosuvastatin Calcium (Crestor -)  5 mg PO DAILY [facility]


Senna/Docusate Sodium (Pericolace -)  2 tablet PO BID [facility]


   Last Admin: 12/10/19 21:27 Dose:  2 tablet


Tiotropium Bromide (Spiriva Respimat)  2 puff IH DAILY [facility]


   Last Admin: 12/10/19 10:43 Dose:  Not Given


Tramadol HCl (Ultram -)  50 mg PO Q3H PRN


   PRN Reason: PAIN LEVEL 1 - 3


Valsartan (Diovan -)  160 mg PO DAILY [facility]











- Objective


Vital Signs: 


 Vital Signs











Temperature  98.3 F   12/11/19 06:00


 


Pulse Rate  83   12/11/19 06:00


 


Respiratory Rate  18   12/11/19 06:00


 


Blood Pressure  152/66   12/11/19 06:00


 


O2 Sat by Pulse Oximetry (%)  96   12/11/19 08:39











Constitutional: Yes: Mild Distress


Cardiovascular: Yes: Regular Rate and Rhythm


Respiratory: Yes: Wheezes (MILD EXPIRATORY WHEEZES SCATTERED)


Gastrointestinal: Yes: Soft, Abdomen, Obese


Genitourinary: Yes: WNL


Musculoskeletal: Yes: Muscle Weakness


Edema: No


Neurological: Yes: WNL


...Motor Strength: LLE


Psychiatric: Yes: WNL


Labs: 


 CBC, BMP





 12/11/19 07:09 











Problem List





- Problems


(1) History of left knee replacement


Assessment/Plan: 


S/P LEFT KNEE JEFERSON


Code(s): Z96.652 - PRESENCE OF LEFT ARTIFICIAL KNEE JOINT   





(2) COPD (chronic obstructive pulmonary disease)


Code(s): J44.9 - CHRONIC OBSTRUCTIVE PULMONARY DISEASE, UNSPECIFIED   





(3) DMII (diabetes mellitus, type 2)


Code(s): E11.9 - TYPE 2 DIABETES MELLITUS WITHOUT COMPLICATIONS   





(4) Hypertension


Code(s): I10 - ESSENTIAL (PRIMARY) HYPERTENSION   





(5) DJD (degenerative joint disease)


Code(s): M19.90 - UNSPECIFIED OSTEOARTHRITIS, UNSPECIFIED SITE   





(6) Obesity


Code(s): E66.9 - OBESITY, UNSPECIFIED   





Assessment/Plan





LEFT KNEE JEFERSON DAY 1 POST OP


RESTART NEBS DUONEB Q 6HRS


SYMBICORT/SINGULAIR


PAIN CONTROL


DVT PROPHYLAXIS


SNF VS HOME HOME PT

## 2019-12-12 VITALS — HEART RATE: 91 BPM | TEMPERATURE: 98 F | SYSTOLIC BLOOD PRESSURE: 150 MMHG | DIASTOLIC BLOOD PRESSURE: 65 MMHG

## 2019-12-12 LAB
DEPRECATED RDW RBC AUTO: 14 % (ref 11.6–15.6)
HCT VFR BLD CALC: 30.6 % (ref 32.4–45.2)
HGB BLD-MCNC: 10.1 GM/DL (ref 10.7–15.3)
MCH RBC QN AUTO: 29.9 PG (ref 25.7–33.7)
MCHC RBC AUTO-ENTMCNC: 33.2 G/DL (ref 32–36)
MCV RBC: 90.1 FL (ref 80–96)
PLATELET # BLD AUTO: 259 K/MM3 (ref 134–434)
PMV BLD: 8.6 FL (ref 7.5–11.1)
RBC # BLD AUTO: 3.4 M/MM3 (ref 3.6–5.2)
WBC # BLD AUTO: 8.7 K/MM3 (ref 4–10.8)

## 2019-12-12 RX ADMIN — DOCUSATE SODIUM,SENNOSIDES SCH TABLET: 50; 8.6 TABLET, FILM COATED ORAL at 09:56

## 2019-12-12 RX ADMIN — Medication SCH EACH: at 09:56

## 2019-12-12 RX ADMIN — FERROUS SULFATE TAB EC 324 MG (65 MG FE EQUIVALENT) SCH MG: 324 (65 FE) TABLET DELAYED RESPONSE at 09:56

## 2019-12-12 RX ADMIN — ASPIRIN 325 MG ORAL TABLET SCH MG: 325 PILL ORAL at 08:36

## 2019-12-12 RX ADMIN — VALSARTAN SCH MG: 160 TABLET, FILM COATED ORAL at 09:56

## 2019-12-12 RX ADMIN — TIOTROPIUM BROMIDE INHALATION SPRAY SCH PUFF: 3.12 SPRAY, METERED RESPIRATORY (INHALATION) at 09:57

## 2019-12-12 RX ADMIN — OXYCODONE HYDROCHLORIDE SCH MG: 10 TABLET, FILM COATED, EXTENDED RELEASE ORAL at 09:56

## 2019-12-12 RX ADMIN — GLIPIZIDE SCH MG: 5 TABLET ORAL at 06:35

## 2019-12-12 RX ADMIN — BUDESONIDE AND FORMOTEROL FUMARATE DIHYDRATE SCH PUFF: 80; 4.5 AEROSOL RESPIRATORY (INHALATION) at 09:57

## 2019-12-12 RX ADMIN — PANTOPRAZOLE SODIUM SCH MG: 40 TABLET, DELAYED RELEASE ORAL at 09:55

## 2019-12-12 RX ADMIN — HYDROCHLOROTHIAZIDE SCH MG: 12.5 CAPSULE ORAL at 09:56

## 2019-12-12 RX ADMIN — AMLODIPINE BESYLATE SCH MG: 5 TABLET ORAL at 09:55

## 2019-12-12 RX ADMIN — ACETAMINOPHEN SCH MG: 325 TABLET ORAL at 06:35

## 2019-12-12 RX ADMIN — ACETAMINOPHEN SCH MG: 325 TABLET ORAL at 00:17

## 2019-12-12 RX ADMIN — ROSUVASTATIN CALCIUM SCH MG: 5 TABLET, FILM COATED ORAL at 09:56

## 2019-12-12 NOTE — PATH
Surgical Pathology Report



Patient Name:  LAN GARAY

Accession #:  [id number]

Med. Rec. #:  [id number]                                                        

   /Age/Gender:  1955 (Age: 64) / F

Account:  [id number]                                                          

             Location: [facility]

Taken:  12/10/2019

Received:  12/10/2019

Reported:  2019

Physicians:  Naeem Croft M.D.

  



Specimen(s) Received

 LEFT KNEE BONES 





Clinical History

Left knee osteoarthritis







Final Diagnosis

KNEE BONES, LEFT, TOTAL KNEE REPLACEMENT:

DEGENERATIVE JOINT DISEASE.





***Electronically Signed***

Rosalind Bunch M.D.





Gross Description

Received in formalin labeled "left knee bones," is a 9.3 x 7.0 x 2.0 cm

aggregate of multiple portions of bone and soft tissue. The tibial plateau

measures 6.7 x 5.5 x 1.7 cm. There is a 1.9 cm in greatest dimension area of

eburnation present. The remaining articular surfaces are tan-brown and diffusely

granular. The underlying trabecular bone is yellow and hard. Representative

sections are submitted in one cassette, following decalcification.





[facility]

## 2019-12-12 NOTE — PN
Progress Note (short form)





- Note


Progress Note: 





Ortho





Pt seen and examined s/p left grecia tkr pod #2


 Selected Entries











  12/12/19





  05:56


 


Temperature 98.0 F


 


Pulse Rate 91 H


 


Respiratory 18





Rate 


 


Blood Pressure 150/65








 Laboratory Tests











  12/12/19





  07:20


 


WBC  8.7


 


Hgb  10.1 L


 


Hct  30.6 L


 


Plt Count  259











dressing c/d/i, calf soft, nt


rom 0-70, nvi





a/p


PT


dvt ppx


pain control


d/c home today


f/u in 1 week

## 2024-06-13 ENCOUNTER — APPOINTMENT (RX ONLY)
Dept: URBAN - METROPOLITAN AREA CLINIC 182 | Facility: CLINIC | Age: 69
Setting detail: DERMATOLOGY
End: 2024-06-13

## 2024-06-13 DIAGNOSIS — D69.2 OTHER NONTHROMBOCYTOPENIC PURPURA: ICD-10-CM

## 2024-06-13 DIAGNOSIS — L57.8 OTHER SKIN CHANGES DUE TO CHRONIC EXPOSURE TO NONIONIZING RADIATION: ICD-10-CM

## 2024-06-13 DIAGNOSIS — L30.4 ERYTHEMA INTERTRIGO: ICD-10-CM

## 2024-06-13 DIAGNOSIS — D22 MELANOCYTIC NEVI: ICD-10-CM

## 2024-06-13 DIAGNOSIS — L70.8 OTHER ACNE: ICD-10-CM

## 2024-06-13 DIAGNOSIS — L82.1 OTHER SEBORRHEIC KERATOSIS: ICD-10-CM

## 2024-06-13 DIAGNOSIS — Z71.89 OTHER SPECIFIED COUNSELING: ICD-10-CM

## 2024-06-13 DIAGNOSIS — Z12.83 ENCOUNTER FOR SCREENING FOR MALIGNANT NEOPLASM OF SKIN: ICD-10-CM

## 2024-06-13 PROBLEM — D22.39 MELANOCYTIC NEVI OF OTHER PARTS OF FACE: Status: ACTIVE | Noted: 2024-06-13

## 2024-06-13 PROCEDURE — ? PRESCRIPTION

## 2024-06-13 PROCEDURE — 99203 OFFICE O/P NEW LOW 30 MIN: CPT

## 2024-06-13 PROCEDURE — ? COUNSELING

## 2024-06-13 RX ORDER — KETOCONAZOLE 20 MG/G
CREAM TOPICAL
Qty: 30 | Refills: 1 | Status: ERX | COMMUNITY
Start: 2024-06-13

## 2024-06-13 RX ORDER — HYDROCORTISONE 25 MG/G
CREAM TOPICAL BID
Qty: 30 | Refills: 2 | Status: ERX | COMMUNITY
Start: 2024-06-13

## 2024-06-13 RX ADMIN — HYDROCORTISONE: 25 CREAM TOPICAL at 00:00

## 2024-06-13 RX ADMIN — KETOCONAZOLE: 20 CREAM TOPICAL at 00:00

## 2024-06-13 ASSESSMENT — LOCATION SIMPLE DESCRIPTION DERM
LOCATION SIMPLE: CHEST
LOCATION SIMPLE: LEFT FOREARM
LOCATION SIMPLE: LEFT FOREHEAD
LOCATION SIMPLE: RIGHT FOREARM
LOCATION SIMPLE: LEFT CHEEK
LOCATION SIMPLE: RIGHT CHEEK
LOCATION SIMPLE: RIGHT BREAST
LOCATION SIMPLE: UPPER BACK

## 2024-06-13 ASSESSMENT — LOCATION ZONE DERM
LOCATION ZONE: TRUNK
LOCATION ZONE: ARM
LOCATION ZONE: TRUNK
LOCATION ZONE: FACE

## 2024-06-13 ASSESSMENT — LOCATION DETAILED DESCRIPTION DERM
LOCATION DETAILED: INFERIOR THORACIC SPINE
LOCATION DETAILED: LEFT PROXIMAL DORSAL FOREARM
LOCATION DETAILED: LEFT MEDIAL SUPERIOR CHEST
LOCATION DETAILED: RIGHT PROXIMAL RADIAL DORSAL FOREARM
LOCATION DETAILED: LEFT INFERIOR MEDIAL FOREHEAD
LOCATION DETAILED: RIGHT PROXIMAL DORSAL FOREARM
LOCATION DETAILED: RIGHT INFRAMAMMARY CREASE (INNER QUADRANT)
LOCATION DETAILED: LEFT CENTRAL MALAR CHEEK
LOCATION DETAILED: RIGHT MEDIAL MALAR CHEEK